# Patient Record
Sex: FEMALE | Race: WHITE | NOT HISPANIC OR LATINO | URBAN - METROPOLITAN AREA
[De-identification: names, ages, dates, MRNs, and addresses within clinical notes are randomized per-mention and may not be internally consistent; named-entity substitution may affect disease eponyms.]

---

## 2017-01-16 ENCOUNTER — COMMUNICATION - HEALTHEAST (OUTPATIENT)
Dept: INTERNAL MEDICINE | Facility: CLINIC | Age: 82
End: 2017-01-16

## 2017-01-16 DIAGNOSIS — G89.4 CHRONIC PAIN SYNDROME: ICD-10-CM

## 2017-04-17 ENCOUNTER — COMMUNICATION - HEALTHEAST (OUTPATIENT)
Dept: INTERNAL MEDICINE | Facility: CLINIC | Age: 82
End: 2017-04-17

## 2017-04-17 DIAGNOSIS — M81.0 OSTEOPOROSIS: ICD-10-CM

## 2017-07-05 ENCOUNTER — COMMUNICATION - HEALTHEAST (OUTPATIENT)
Dept: INTERNAL MEDICINE | Facility: CLINIC | Age: 82
End: 2017-07-05

## 2017-07-05 DIAGNOSIS — G89.4 CHRONIC PAIN SYNDROME: ICD-10-CM

## 2017-07-17 ENCOUNTER — COMMUNICATION - HEALTHEAST (OUTPATIENT)
Dept: INTERNAL MEDICINE | Facility: CLINIC | Age: 82
End: 2017-07-17

## 2017-07-17 DIAGNOSIS — M81.0 OSTEOPOROSIS: ICD-10-CM

## 2017-07-18 ENCOUNTER — COMMUNICATION - HEALTHEAST (OUTPATIENT)
Dept: INTERNAL MEDICINE | Facility: CLINIC | Age: 82
End: 2017-07-18

## 2017-07-18 DIAGNOSIS — M81.0 OSTEOPOROSIS: ICD-10-CM

## 2017-10-19 ENCOUNTER — COMMUNICATION - HEALTHEAST (OUTPATIENT)
Dept: INTERNAL MEDICINE | Facility: CLINIC | Age: 82
End: 2017-10-19

## 2017-10-19 DIAGNOSIS — M81.0 OSTEOPOROSIS: ICD-10-CM

## 2017-10-23 ENCOUNTER — OFFICE VISIT - HEALTHEAST (OUTPATIENT)
Dept: INTERNAL MEDICINE | Facility: CLINIC | Age: 82
End: 2017-10-23

## 2017-10-23 DIAGNOSIS — Z00.00 ROUTINE HEALTH MAINTENANCE: ICD-10-CM

## 2017-10-23 DIAGNOSIS — E78.5 HYPERLIPEMIA: ICD-10-CM

## 2017-10-23 DIAGNOSIS — R41.3 MEMORY PROBLEM: ICD-10-CM

## 2017-10-23 DIAGNOSIS — R41.81 SENILITY: ICD-10-CM

## 2017-10-23 DIAGNOSIS — G89.4 CHRONIC PAIN SYNDROME: ICD-10-CM

## 2017-10-23 ASSESSMENT — MIFFLIN-ST. JEOR: SCORE: 993.7

## 2017-10-24 ENCOUNTER — COMMUNICATION - HEALTHEAST (OUTPATIENT)
Dept: INTERNAL MEDICINE | Facility: CLINIC | Age: 82
End: 2017-10-24

## 2018-02-19 ENCOUNTER — COMMUNICATION - HEALTHEAST (OUTPATIENT)
Dept: INTERNAL MEDICINE | Facility: CLINIC | Age: 83
End: 2018-02-19

## 2018-02-19 DIAGNOSIS — G89.4 CHRONIC PAIN SYNDROME: ICD-10-CM

## 2018-02-19 DIAGNOSIS — M81.0 OSTEOPOROSIS: ICD-10-CM

## 2018-06-02 ENCOUNTER — COMMUNICATION - HEALTHEAST (OUTPATIENT)
Dept: INTERNAL MEDICINE | Facility: CLINIC | Age: 83
End: 2018-06-02

## 2018-06-02 DIAGNOSIS — M81.0 OSTEOPOROSIS: ICD-10-CM

## 2018-07-21 ENCOUNTER — COMMUNICATION - HEALTHEAST (OUTPATIENT)
Dept: INTERNAL MEDICINE | Facility: CLINIC | Age: 83
End: 2018-07-21

## 2018-07-21 DIAGNOSIS — G89.4 CHRONIC PAIN SYNDROME: ICD-10-CM

## 2018-08-30 ENCOUNTER — COMMUNICATION - HEALTHEAST (OUTPATIENT)
Dept: INTERNAL MEDICINE | Facility: CLINIC | Age: 83
End: 2018-08-30

## 2018-08-30 DIAGNOSIS — M81.0 OSTEOPOROSIS: ICD-10-CM

## 2018-11-26 ENCOUNTER — COMMUNICATION - HEALTHEAST (OUTPATIENT)
Dept: INTERNAL MEDICINE | Facility: CLINIC | Age: 83
End: 2018-11-26

## 2018-11-26 DIAGNOSIS — M81.0 OSTEOPOROSIS: ICD-10-CM

## 2019-02-13 ENCOUNTER — COMMUNICATION - HEALTHEAST (OUTPATIENT)
Dept: INTERNAL MEDICINE | Facility: CLINIC | Age: 84
End: 2019-02-13

## 2019-02-13 DIAGNOSIS — G89.4 CHRONIC PAIN SYNDROME: ICD-10-CM

## 2019-03-11 ENCOUNTER — COMMUNICATION - HEALTHEAST (OUTPATIENT)
Dept: INTERNAL MEDICINE | Facility: CLINIC | Age: 84
End: 2019-03-11

## 2019-03-11 DIAGNOSIS — M81.0 OSTEOPOROSIS: ICD-10-CM

## 2019-06-01 ENCOUNTER — COMMUNICATION - HEALTHEAST (OUTPATIENT)
Dept: INTERNAL MEDICINE | Facility: CLINIC | Age: 84
End: 2019-06-01

## 2019-06-01 DIAGNOSIS — M81.0 OSTEOPOROSIS: ICD-10-CM

## 2019-07-02 ENCOUNTER — COMMUNICATION - HEALTHEAST (OUTPATIENT)
Dept: INTERNAL MEDICINE | Facility: CLINIC | Age: 84
End: 2019-07-02

## 2019-07-02 DIAGNOSIS — M81.0 OSTEOPOROSIS: ICD-10-CM

## 2019-08-20 ENCOUNTER — COMMUNICATION - HEALTHEAST (OUTPATIENT)
Dept: FAMILY MEDICINE | Facility: CLINIC | Age: 84
End: 2019-08-20

## 2019-08-20 ENCOUNTER — OFFICE VISIT - HEALTHEAST (OUTPATIENT)
Dept: FAMILY MEDICINE | Facility: CLINIC | Age: 84
End: 2019-08-20

## 2019-08-20 ENCOUNTER — AMBULATORY - HEALTHEAST (OUTPATIENT)
Dept: SCHEDULING | Facility: CLINIC | Age: 84
End: 2019-08-20

## 2019-08-20 DIAGNOSIS — Z23 NEED FOR VACCINATION: ICD-10-CM

## 2019-08-20 DIAGNOSIS — M81.0 OSTEOPOROSIS: ICD-10-CM

## 2019-08-20 DIAGNOSIS — L97.111: ICD-10-CM

## 2019-08-20 DIAGNOSIS — Z00.00 ROUTINE GENERAL MEDICAL EXAMINATION AT A HEALTH CARE FACILITY: ICD-10-CM

## 2019-08-20 DIAGNOSIS — G89.4 CHRONIC PAIN SYNDROME: ICD-10-CM

## 2019-08-20 LAB
ANION GAP SERPL CALCULATED.3IONS-SCNC: 6 MMOL/L (ref 5–18)
CHLORIDE BLD-SCNC: 108 MMOL/L (ref 98–107)
CO2 SERPL-SCNC: 27 MMOL/L (ref 22–31)
CREAT SERPL-MCNC: 0.64 MG/DL (ref 0.6–1.1)
GFR SERPL CREATININE-BSD FRML MDRD: >60 ML/MIN/1.73M2
POTASSIUM BLD-SCNC: 4.2 MMOL/L (ref 3.5–5)
SODIUM SERPL-SCNC: 141 MMOL/L (ref 136–145)

## 2019-08-20 ASSESSMENT — MIFFLIN-ST. JEOR: SCORE: 908.88

## 2019-08-21 ENCOUNTER — COMMUNICATION - HEALTHEAST (OUTPATIENT)
Dept: NURSING | Facility: CLINIC | Age: 84
End: 2019-08-21

## 2019-08-27 ENCOUNTER — COMMUNICATION - HEALTHEAST (OUTPATIENT)
Dept: FAMILY MEDICINE | Facility: CLINIC | Age: 84
End: 2019-08-27

## 2019-08-27 ENCOUNTER — COMMUNICATION - HEALTHEAST (OUTPATIENT)
Dept: NURSING | Facility: CLINIC | Age: 84
End: 2019-08-27

## 2020-02-20 ENCOUNTER — COMMUNICATION - HEALTHEAST (OUTPATIENT)
Dept: FAMILY MEDICINE | Facility: CLINIC | Age: 85
End: 2020-02-20

## 2020-02-20 DIAGNOSIS — M81.0 OSTEOPOROSIS: ICD-10-CM

## 2020-06-03 ENCOUNTER — COMMUNICATION - HEALTHEAST (OUTPATIENT)
Dept: FAMILY MEDICINE | Facility: CLINIC | Age: 85
End: 2020-06-03

## 2020-06-03 DIAGNOSIS — G89.4 CHRONIC PAIN SYNDROME: ICD-10-CM

## 2020-08-26 ENCOUNTER — COMMUNICATION - HEALTHEAST (OUTPATIENT)
Dept: FAMILY MEDICINE | Facility: CLINIC | Age: 85
End: 2020-08-26

## 2020-08-26 DIAGNOSIS — M81.0 OSTEOPOROSIS: ICD-10-CM

## 2020-09-21 ENCOUNTER — OFFICE VISIT - HEALTHEAST (OUTPATIENT)
Dept: FAMILY MEDICINE | Facility: CLINIC | Age: 85
End: 2020-09-21

## 2020-09-21 DIAGNOSIS — L30.4 INTERTRIGO: ICD-10-CM

## 2020-09-21 DIAGNOSIS — H61.22 IMPACTED CERUMEN OF LEFT EAR: ICD-10-CM

## 2020-09-21 DIAGNOSIS — Z23 NEED FOR VACCINATION: ICD-10-CM

## 2020-09-21 DIAGNOSIS — M81.0 AGE-RELATED OSTEOPOROSIS WITHOUT CURRENT PATHOLOGICAL FRACTURE: ICD-10-CM

## 2020-09-21 DIAGNOSIS — R05.9 COUGH: ICD-10-CM

## 2020-09-21 DIAGNOSIS — K59.00 CONSTIPATION, UNSPECIFIED CONSTIPATION TYPE: ICD-10-CM

## 2020-09-21 DIAGNOSIS — Z00.00 MEDICARE ANNUAL WELLNESS VISIT, SUBSEQUENT: ICD-10-CM

## 2020-09-21 ASSESSMENT — MIFFLIN-ST. JEOR: SCORE: 875.77

## 2020-12-04 ENCOUNTER — COMMUNICATION - HEALTHEAST (OUTPATIENT)
Dept: FAMILY MEDICINE | Facility: CLINIC | Age: 85
End: 2020-12-04

## 2020-12-09 ENCOUNTER — COMMUNICATION - HEALTHEAST (OUTPATIENT)
Dept: FAMILY MEDICINE | Facility: CLINIC | Age: 85
End: 2020-12-09

## 2020-12-15 ENCOUNTER — COMMUNICATION - HEALTHEAST (OUTPATIENT)
Dept: FAMILY MEDICINE | Facility: CLINIC | Age: 85
End: 2020-12-15

## 2020-12-15 DIAGNOSIS — M81.0 OSTEOPOROSIS: ICD-10-CM

## 2021-01-07 ENCOUNTER — OFFICE VISIT - HEALTHEAST (OUTPATIENT)
Dept: INTERNAL MEDICINE | Facility: CLINIC | Age: 86
End: 2021-01-07

## 2021-01-07 DIAGNOSIS — M19.90 ARTHRITIS PAIN: ICD-10-CM

## 2021-01-07 DIAGNOSIS — M81.0 AGE-RELATED OSTEOPOROSIS WITHOUT CURRENT PATHOLOGICAL FRACTURE: ICD-10-CM

## 2021-01-07 DIAGNOSIS — R41.3 MEMORY PROBLEM: ICD-10-CM

## 2021-01-07 LAB
ANION GAP SERPL CALCULATED.3IONS-SCNC: 9 MMOL/L (ref 5–18)
BUN SERPL-MCNC: 18 MG/DL (ref 8–28)
CALCIUM SERPL-MCNC: 8.5 MG/DL (ref 8.5–10.5)
CHLORIDE BLD-SCNC: 107 MMOL/L (ref 98–107)
CO2 SERPL-SCNC: 23 MMOL/L (ref 22–31)
CREAT SERPL-MCNC: 0.62 MG/DL (ref 0.6–1.1)
ERYTHROCYTE [DISTWIDTH] IN BLOOD BY AUTOMATED COUNT: 13.1 % (ref 11–14.5)
GFR SERPL CREATININE-BSD FRML MDRD: >60 ML/MIN/1.73M2
GLUCOSE BLD-MCNC: 78 MG/DL (ref 70–125)
HCT VFR BLD AUTO: 43.8 % (ref 35–47)
HGB BLD-MCNC: 14.1 G/DL (ref 12–16)
MCH RBC QN AUTO: 32.4 PG (ref 27–34)
MCHC RBC AUTO-ENTMCNC: 32.2 G/DL (ref 32–36)
MCV RBC AUTO: 101 FL (ref 80–100)
PLATELET # BLD AUTO: 161 THOU/UL (ref 140–440)
PMV BLD AUTO: 11.5 FL (ref 8.5–12.5)
POTASSIUM BLD-SCNC: NORMAL MMOL/L
RBC # BLD AUTO: 4.35 MILL/UL (ref 3.8–5.4)
SODIUM SERPL-SCNC: 139 MMOL/L (ref 136–145)
WBC: 4.1 THOU/UL (ref 4–11)

## 2021-01-08 ENCOUNTER — COMMUNICATION - HEALTHEAST (OUTPATIENT)
Dept: INTERNAL MEDICINE | Facility: CLINIC | Age: 86
End: 2021-01-08

## 2021-01-08 DIAGNOSIS — M81.0 SENILE OSTEOPOROSIS: ICD-10-CM

## 2021-01-08 DIAGNOSIS — M81.0 AGE-RELATED OSTEOPOROSIS WITHOUT CURRENT PATHOLOGICAL FRACTURE: ICD-10-CM

## 2021-01-08 LAB — 25(OH)D3 SERPL-MCNC: 9 NG/ML (ref 30–80)

## 2021-01-14 ENCOUNTER — COMMUNICATION - HEALTHEAST (OUTPATIENT)
Dept: FAMILY MEDICINE | Facility: CLINIC | Age: 86
End: 2021-01-14

## 2021-01-18 ENCOUNTER — COMMUNICATION - HEALTHEAST (OUTPATIENT)
Dept: INTERNAL MEDICINE | Facility: CLINIC | Age: 86
End: 2021-01-18

## 2021-01-18 DIAGNOSIS — M81.0 AGE-RELATED OSTEOPOROSIS WITHOUT CURRENT PATHOLOGICAL FRACTURE: ICD-10-CM

## 2021-01-18 DIAGNOSIS — Z92.29 PERSONAL HISTORY OF OTHER DRUG THERAPY: ICD-10-CM

## 2021-01-19 ENCOUNTER — COMMUNICATION - HEALTHEAST (OUTPATIENT)
Dept: INTERNAL MEDICINE | Facility: CLINIC | Age: 86
End: 2021-01-19

## 2021-01-21 ENCOUNTER — AMBULATORY - HEALTHEAST (OUTPATIENT)
Dept: NURSING | Facility: CLINIC | Age: 86
End: 2021-01-21

## 2021-02-11 ENCOUNTER — DOCUMENTATION ONLY (OUTPATIENT)
Dept: OTHER | Facility: CLINIC | Age: 86
End: 2021-02-11

## 2021-02-11 ENCOUNTER — AMBULATORY - HEALTHEAST (OUTPATIENT)
Dept: OTHER | Facility: CLINIC | Age: 86
End: 2021-02-11

## 2021-05-24 ENCOUNTER — RECORDS - HEALTHEAST (OUTPATIENT)
Dept: ADMINISTRATIVE | Facility: CLINIC | Age: 86
End: 2021-05-24

## 2021-05-25 ENCOUNTER — RECORDS - HEALTHEAST (OUTPATIENT)
Dept: ADMINISTRATIVE | Facility: CLINIC | Age: 86
End: 2021-05-25

## 2021-05-26 ENCOUNTER — RECORDS - HEALTHEAST (OUTPATIENT)
Dept: ADMINISTRATIVE | Facility: CLINIC | Age: 86
End: 2021-05-26

## 2021-05-27 ENCOUNTER — RECORDS - HEALTHEAST (OUTPATIENT)
Dept: ADMINISTRATIVE | Facility: CLINIC | Age: 86
End: 2021-05-27

## 2021-05-28 ENCOUNTER — RECORDS - HEALTHEAST (OUTPATIENT)
Dept: ADMINISTRATIVE | Facility: CLINIC | Age: 86
End: 2021-05-28

## 2021-05-29 NOTE — TELEPHONE ENCOUNTER
RN cannot approve Refill Request    RN can NOT refill this medication med is not covered by policy/route to provider. Last office visit: Visit date not found Last Physical: Visit date not found Last MTM visit: Visit date not found Last visit same specialty: 10/1/2015 Chilo العلي MD.  Next visit within 3 mo: Visit date not found  Next physical within 3 mo: Visit date not found      Zoya Yanez, [facility] Connection Triage/Med Refill 6/1/2019    Requested Prescriptions   Pending Prescriptions Disp Refills     raloxifene (EVISTA) 60 mg tablet [Pharmacy Med Name: RALOXIFENE TAB 60MG] 90 tablet 0     Sig: TAKE 1 TABLET DAILY       There is no refill protocol information for this order

## 2021-05-30 NOTE — TELEPHONE ENCOUNTER
Pts son answered the phone and there is no consent on file   Pt has alzheimer and pts son knows that the pt needs an appointment   Pts son will call us back     Rx pended for 30 day supply

## 2021-05-30 NOTE — TELEPHONE ENCOUNTER
RN cannot approve Refill Request    Former patient of Korina & has not established care with another provider.  Please assign refill request to covering provider per Clinic standard process.    RN can NOT refill this medication med is not covered by policy/route to provider. Last office visit: Visit date not found Last Physical: Visit date not found Last MTM visit: Visit date not found Last visit same specialty: 10/1/2015 Chilo العلي MD.  Next visit within 3 mo: Visit date not found  Next physical within 3 mo: Visit date not found      Elsi Higgins, [facility] Triage/Med Refill 7/2/2019    Requested Prescriptions   Pending Prescriptions Disp Refills     raloxifene (EVISTA) 60 mg tablet [Pharmacy Med Name: RALOXIFENE TAB 60MG] 30 tablet 0     Sig: TAKE 1 TABLET DAILY (NEED  TO ESTABLISH CARE)       There is no refill protocol information for this order

## 2021-05-31 ENCOUNTER — RECORDS - HEALTHEAST (OUTPATIENT)
Dept: ADMINISTRATIVE | Facility: CLINIC | Age: 86
End: 2021-05-31

## 2021-05-31 ENCOUNTER — COMMUNICATION - HEALTHEAST (OUTPATIENT)
Dept: SCHEDULING | Facility: CLINIC | Age: 86
End: 2021-05-31

## 2021-05-31 VITALS — WEIGHT: 141 LBS | HEIGHT: 63 IN | BODY MASS INDEX: 24.98 KG/M2

## 2021-05-31 NOTE — PROGRESS NOTES
Scheduled Follow Up Call: Attempt 2 Community Health Worker called and left a message for the patient. If the patient is returning my call, please transfer the patient to Sharon at ext. [phone].    LMTCB: [facility] Referral- No returned calls  Unreachable letter sent 8/27/19

## 2021-05-31 NOTE — PROGRESS NOTES
Scheduled Follow Up Call: Attempt 1 Community Health Worker called and left a message for the patient. If the patient is returning my call, please transfer the patient to Sharon at ext. [phone].    LMTCB: [facility] Referral  Patient and son Drew have [facility] contact number.  Patient new to clinic, may need support team    Next Outreach: 8/23/19

## 2021-05-31 NOTE — PROGRESS NOTES
Assessment and Plan:     1. Routine general medical examination at a health care facility  - Electrolyte Profile  - Creatinine  Referral to care management was also ordered just so patient can be established and have a care team, given her age, and son unsure of how her care will look like in the future, though at the current time he is managing well.    2. Chronic pain syndrome  Patient has had a history of chronic pain syndrome related to her osteoarthritis, and had been taking Celebrex daily, though her son states that patient has not complained about pain.  For this reason, advised him to try holding Celebrex to see if she is still in pain, and to not use medication if not needed.  - celecoxib (CELEBREX) 200 MG capsule; Take 1 capsule (200 mg total) by mouth daily as needed for pain.  Dispense: 90 capsule; Refill: 1    3. Osteoporosis  I would like to check DEXA scan, consider stopping meloxicam given she has been on medication for several years and has not had a recent DEXA scan.  - raloxifene (EVISTA) 60 mg tablet; Take 1 tablet (60 mg total) by mouth daily.  Dispense: 90 tablet; Refill: 1  - DXA Bone Density Scan; Future    4. Skin ulcer of hip, right, limited to breakdown of skin (H)  This may be due to possible pressure ulcer, given she does sit or lie for long periods of time.  Recommend referral to wound clinic for further evaluation.  - Ambulatory referral to Wound Clinic    5. Need for vaccination  I have discussed the risks and benefits of all of the vaccine components with the patient.  All questions have been answered.  - Td, Preservative Free (green label)     The patient's current medical problems were reviewed.    I have had an Advance Directives discussion with the patient.  The following health maintenance schedule was reviewed with the patient and provided in printed form in the after visit summary:   Health Maintenance   Topic Date Due     DXA SCAN  04/23/1989     ZOSTER VACCINES (2 of 3)  "06/30/2009     FALL RISK ASSESSMENT  03/19/2016     TD 18+ HE  04/18/2016     MEDICARE ANNUAL WELLNESS VISIT  10/23/2018     INFLUENZA VACCINE RULE BASED (1) 08/01/2019     ADVANCE CARE PLANNING  10/23/2022     PNEUMOCOCCAL POLYSACCHARIDE VACCINE AGE 65 AND OVER  Completed     PNEUMOCOCCAL CONJUGATE VACCINE FOR ADULTS (PCV13 OR PREVNAR)  Completed        Subjective:   Chief Complaint: Lorna England is an 89 y.o. female, new to family medicine, here today with son \"Bill\", here for an Annual Wellness visit.  Their previous PCP has retired, though she hasn't been seen since 2017.  Bill says that patient's cognition has declined since last visit, though she is still easy to care for in his home though is thinking about care in the future and possible ongoing decline.      HPI:  Requesting refill of of raloxifene, has been on medication \"for years\" for osteoporosis. Hasn't had DEXA scan in our system.    Son had also noted over the last month some skin breakdown on her right hip.  Patient has had history of for total hip replacements, 2 on each side.  The skin changes he has noted over the last month.      Review of Systems:  Understands and can comprehend, but has difficulty with word finding.  Please see above.  The rest of the review of systems are negative for all systems.    Patient Care Team:  Chilo العلي MD as PCP - General  Rm Lane MD (Orthopedic Surgery)     Patient Active Problem List   Diagnosis     Esophageal Disorders     Chronic Pain Syndrome     Osteoarthritis     Osteoporosis     Do Not Resuscitate Form In Chart     Irritable Bowel Syndrome     Osteoarthritis Of The Hip     Frailty     Carpal Tunnel Syndrome     Diverticulosis     Nephrolithiasis     Urinary Frequency     Senile osteoporosis     Memory problem     No past medical history on file.   Past Surgical History:   Procedure Laterality Date     MS APPENDECTOMY      Description: Appendectomy;  Recorded: 11/30/2007;     MS " REMOVE TONSILS/ADENOIDS,<13 Y/O      Description: Tonsillectomy With Adenoidectomy;  Recorded: 11/30/2007;     WI TOTAL HIP ARTHROPLASTY      Description: Total Hip Replacement;  Recorded: 11/30/2007;     WI TOTAL HIP ARTHROPLASTY      Description: Total Hip Replacement;  Recorded: 11/30/2007;  Comments: total 5 hip revisions      No family history on file.   Social History     Socioeconomic History     Marital status:      Spouse name: Not on file     Number of children: Not on file     Years of education: Not on file     Highest education level: Not on file   Occupational History     Not on file   Social Needs     Financial resource strain: Not on file     Food insecurity:     Worry: Not on file     Inability: Not on file     Transportation needs:     Medical: Not on file     Non-medical: Not on file   Tobacco Use     Smoking status: Never Smoker     Smokeless tobacco: Never Used   Substance and Sexual Activity     Alcohol use: Not on file     Drug use: Not on file     Sexual activity: Not on file   Lifestyle     Physical activity:     Days per week: Not on file     Minutes per session: Not on file     Stress: Not on file   Relationships     Social connections:     Talks on phone: Not on file     Gets together: Not on file     Attends Zoroastrian service: Not on file     Active member of club or organization: Not on file     Attends meetings of clubs or organizations: Not on file     Relationship status: Not on file     Intimate partner violence:     Fear of current or ex partner: Not on file     Emotionally abused: Not on file     Physically abused: Not on file     Forced sexual activity: Not on file   Other Topics Concern     Not on file   Social History Narrative     Not on file      Current Outpatient Medications   Medication Sig Dispense Refill     celecoxib (CELEBREX) 200 MG capsule TAKE 1 CAPSULE DAILY 90 capsule 1     raloxifene (EVISTA) 60 mg tablet TAKE 1 TABLET DAILY 30 tablet 0     amoxicillin  "(AMOXIL) 500 MG capsule Take 500 mg by mouth as needed. Take 4 capsules 1 hour before dental appointments       celecoxib (CELEBREX) 200 MG capsule TAKE 1 CAPSULE DAILY 90 capsule 1     multivitamin therapeutic (THERAGRAN) tablet Take 1 tablet by mouth daily.       raloxifene (EVISTA) 60 mg tablet TAKE 1 TABLET DAILY (NEED  TO ESTABLISH CARE) 30 tablet 0     triamcinolone (KENALOG) 0.1 % cream Apply to rash with Nystatin cream twice daily as needed 30 g 1     No current facility-administered medications for this visit.       Objective:   Vital Signs:   Visit Vitals  /72 (Patient Site: Right Arm, Patient Position: Sitting, Cuff Size: Adult Regular)   Pulse 76   Temp 97.6  F (36.4  C) (Axillary)   Resp 18   Ht 5' 3\" (1.6 m)   Wt 122 lb 4.8 oz (55.5 kg)   BMI 21.66 kg/m         VisionScreening:  No exam data present     PHYSICAL EXAM  General Appearance: Alert, cooperative, no distress, appears stated age  Head: Normocephalic, without obvious abnormality, atraumatic  Eyes: PERRL, conjunctiva/corneas clear, EOM's intact  Ears: Normal TM's and external ear canals, both ears  Throat: Lips, mucosa, and tongue normal; teeth and gums normal  Neck: Supple, symmetrical, trachea midline, no adenopathy;  thyroid: not enlarged, symmetric, no tenderness/mass/nodules;   Back: Symmetric, no curvature, ROM normal, no CVA tenderness  Lungs: Clear to auscultation bilaterally, respirations unlabored  Breasts: No breast masses, tenderness, asymmetry, or nipple discharge.  Heart: Regular rate and rhythm, no murmur.   Abdomen: Soft, non-tender, bowel sounds active all four quadrants,  no masses, no organomegaly  Pelvic:Not examined  Extremities: Extremities normal, atraumatic, no cyanosis or edema  Skin: Skin color, texture, turgor normal, no rashes or lesions EXCEPT 2 patches of thin skin breakdown noted on right lateral hip  Lymph nodes: Cervical, supraclavicular, and axillary nodes normal  Neurologic: Alert.   Psych: Normal " affect.  Does not appear anxious or depressed.        Assessment Results 8/20/2019   Activities of Daily Living 5-6 - Severe functional impairment   Instrumental Activities of Daily Living 5-6 - Severe functional impairment   Get Up and Go Score -   Mini Cog Total Score 0   Some recent data might be hidden     A Mini-Cog score of 0-2 suggests the possibility of dementia, score of 3-5 suggests no dementia    Identified Health Risks:     The patient reports that she has difficulty with activities of daily living, but son feels he can care for her at this time.The patient reports that she has difficulty with instrumental activities of daily living.  She was provided with a list of local organizations that provide support services and will refer to home health as well.   Information on urinary incontinence and treatment options given to patient.  The patient was provided with suggestions to help her develop a healthy emotional lifestyle.   Patient's advanced directive was discussed and I am comfortable with the patient's wishes.        The patient was provided with appropriate referrals to address her memory problem.

## 2021-05-31 NOTE — PATIENT INSTRUCTIONS - HE
Patient Education   Activities of Daily Living  Your Health Risk Assessment indicates you have difficulties with activities of daily living such as eating, getting dressed, grooming, bathing, walking, or using the toilet. Please make a follow up appointment for us to address this issue in more detail.     Patient Education   Instrumental Activities of Daily Living  Your Health Risk Assessment indicates you have difficulties with instrumental activities of daily living which include laundry, housekeeping, banking, shopping, using the telephone, food preparation, transportation, or taking your own medications. Please make a follow up appointment for us to address this issue in more detail.    [employer] has resources available on the following website: [email or web address]     Also, here is a local agency that provides help with meals and other assistance:   [facility] Line: 849.659.2639     Patient Education   Urinary Incontinence, Female (Adult)  Urinary incontinence means loss of control of the bladder. This problem affects many women, especially as they get older. If you have incontinence, you may be embarrassed to ask for help. But know that this problem can be treated.  Types of Incontinence  There are different types of incontinence. Two of the main types are described here. You can have more than one type.    Stress incontinence. With this type, urine leaks when pressure (stress) is put on the bladder. This may happen when you cough, sneeze, or laugh. Stress incontinence most often occurs because the pelvic floor muscles that support the bladder and urethra are weak. This can happen after pregnancy and vaginal childbirth or a hysterectomy. It can also be due to excess body weight or hormone changes.    Urge incontinence (also called overactive bladder). With this type, a sudden urge to urinate is felt often. This may happen even though there may not be much urine in the bladder. The  need to urinate often during the night is common. Urge incontinence most often occurs because of bladder spasms. This may be due to bladder irritation or infection. Damage to bladder nerves or pelvic muscles, constipation, and certain medicines can also lead to urge incontinence.  Treatment of urinary incontinence depends on the cause. Further evaluation is needed to find the type you have. This will likely include an exam and certain tests. Based on the results, you and your healthcare provider can then plan treatment. Until a diagnosis is made, the home care tips below can help relieve symptoms.  Home care    Do pelvic floor muscle exercises, if they are prescribed. The pelvic floor muscles help support the bladder and urethra. Many women find that their symptoms improve when doing special exercises that strengthen these muscles. To do the exercises contract the muscles you would use to stop your stream of urine, but do this when you re not urinating. Hold for 10 seconds, then relax. Repeat 10 to 20 times in a row, at least 3 times a day. Your provider may give you other instructions for how to do the exercises and how often.    Keep a bladder diary. This helps track how often and how much you urinate over a set period of time. Bring this diary with you to your next visit with the provider. The information can help your provider learn more about your bladder problem.    Lose weight, if advised to by your provider. Excess weight puts pressure on the bladder. Your provider can help you create a weight-loss plan that s right for you. This may include exercising more and making certain diet changes.    Don't consume foods and drinks that may irritate the bladder. These can include alcohol and caffeinated drinks.    Quit smoking. Smoking and other tobacco use can lead to chronic cough that strains the pelvic floor muscles. Smoking may also damage the bladder and urethra. Talk with your provider about treatments or  methods you can use to quit smoking.    If drinking large amounts of fluid causes you to have symptoms, you may be advised to limit your fluid intake. You may also be advised to drink most of your fluids during the day and to limit fluids at night.    If you re worried about urine leakage or accidents, you may wear absorbent pads to catch urine. Change the pads often. This helps reduce discomfort. It may also reduce the risk of skin or bladder infections.  Follow-up care  Follow up with your healthcare provider, or as directed. It may take some to find the right treatment for your problem. Your treatment plan may include special therapies or medicines. Certain procedures or surgery may also be options. Be sure to discuss any questions you have with your provider.  When to seek medical advice  Call the healthcare provider right away if any of these occur:    Fever of 100.4 F (38 C) or higher, or as directed by your provider    Bladder pain or fullness    Abdominal swelling    Nausea or vomiting    Back pain    Weakness, dizziness or fainting  Date Last Reviewed: 10/1/2017    4594-9579 [employer]. [address] [place] [address]. All rights reserved. This information is not intended as a substitute for professional medical care. Always follow your healthcare professional's instructions.     Patient Education   Your Health Risk Assessment indicates you feel you are not in good emotional health.    Recreation   Recreation is not limited to sports and team events. It includes any activity that provides relaxation, interest, enjoyment, and exercise. Recreation provides an outlet for physical, mental, and social energy. It can give a sense of worth and achievement. It can help you stay healthy.    Mental Exercise and Social Involvement  Mental and emotional health is as important as physical health. Keep in touch with friends and family. Stay as active as possible. Continue to learn and challenge  yourself.   Things you can do to stay mentally active are:    Learn something new, like a foreign language or musical instrument.     Play SCRABBLE or do crossword puzzles. If you cannot find people to play these games with you at home, you can play them with others on your computer through the Internet.     Join a games club--anything from card games to chess or checkers or lawn bowling.     Start a new hobby.     Go back to school.     Volunteer.     Read.     Keep up with world events.         Advance Directive  Patient s advance directive was discussed and I am comfortable with the patient s wishes.  Patient Education   Personalized Prevention Plan  You are due for the preventive services outlined below.  Your care team is available to assist you in scheduling these services.  If you have already completed any of these items, please share that information with your care team to update in your medical record.  Health Maintenance   Topic Date Due     DXA SCAN  04/23/1989     ZOSTER VACCINES (2 of 3) 06/30/2009     FALL RISK ASSESSMENT  03/19/2016     TD 18+ HE  04/18/2016     MEDICARE ANNUAL WELLNESS VISIT  10/23/2018     INFLUENZA VACCINE RULE BASED (1) 08/01/2019     ADVANCE CARE PLANNING  10/23/2022     PNEUMOCOCCAL POLYSACCHARIDE VACCINE AGE 65 AND OVER  Completed     PNEUMOCOCCAL CONJUGATE VACCINE FOR ADULTS (PCV13 OR PREVNAR)  Completed

## 2021-06-03 VITALS — WEIGHT: 122.3 LBS | BODY MASS INDEX: 21.67 KG/M2 | HEIGHT: 63 IN

## 2021-06-04 VITALS
TEMPERATURE: 98 F | HEART RATE: 79 BPM | WEIGHT: 115 LBS | BODY MASS INDEX: 20.38 KG/M2 | HEIGHT: 63 IN | RESPIRATION RATE: 20 BRPM | OXYGEN SATURATION: 97 % | SYSTOLIC BLOOD PRESSURE: 112 MMHG | DIASTOLIC BLOOD PRESSURE: 68 MMHG

## 2021-06-05 VITALS — TEMPERATURE: 97 F | HEART RATE: 102 BPM | WEIGHT: 113 LBS | BODY MASS INDEX: 20.02 KG/M2 | OXYGEN SATURATION: 95 %

## 2021-06-06 NOTE — TELEPHONE ENCOUNTER
RN cannot approve Refill Request    RN can NOT refill this medication med is not covered by policy/route to provider. Last office visit: Visit date not found Last Physical: 8/20/2019 Last MTM visit: Visit date not found Last visit same specialty: Visit date not found.  Next visit within 3 mo: Visit date not found  Next physical within 3 mo: Visit date not found      Svetlana Glover, [facility] Triage/Med Refill 2/23/2020    Requested Prescriptions   Pending Prescriptions Disp Refills     raloxifene (EVISTA) 60 mg tablet [Pharmacy Med Name: RALOXIFENE TAB 60MG] 90 tablet 1     Sig: TAKE 1 TABLET DAILY       There is no refill protocol information for this order

## 2021-06-08 NOTE — TELEPHONE ENCOUNTER
RN cannot approve Refill Request    RN can NOT refill this medication med is not covered by policy/route to provider     . Last office visit: Visit date not found Last Physical: 8/20/2019 Last MTM visit: Visit date not found Last visit same specialty: Visit date not found.  Next visit within 3 mo: Visit date not found  Next physical within 3 mo: Visit date not found      Angelika Castillo, [facility] Triage/Med Refill 6/4/2020    Requested Prescriptions   Pending Prescriptions Disp Refills     celecoxib (CELEBREX) 200 MG capsule [Pharmacy Med Name: CELECOXIB CAP 200MG] 90 capsule 1     Sig: TAKE 1 CAPSULE DAILY AS    NEEDED FOR PAIN       There is no refill protocol information for this order

## 2021-06-11 NOTE — TELEPHONE ENCOUNTER
3 month rx signed. Needs appointment before additional refills, as I haven't seen since August 2019.  Also, we had discussed that she should see our osteoporosis doctors instead of continuing this med too. Please assist patient in scheduling  appointment.

## 2021-06-11 NOTE — PROGRESS NOTES
Assessment and Plan:     Patient has been advised of split billing requirements and indicates understanding: Yes  1. Medicare annual wellness visit, subsequent  Frail, elderly woman, essentially wheelchair bound.  Son cares for her 24/7. Senile type dementia, patient did not speak with me today.    2. Constipation, unspecified constipation type  Appears that patient can have regular bowel movements with MiraLAX.  Given she is only taking it once every 3 days, discussed with son that he may increase to every other day or even daily if needed.    3. Intertrigo  Rash appears consistent with intertrigo, will prescribe nystatin powder, discussed use.  However if symptoms persist despite use, recommend returning to clinic for reevaluation.  - nystatin (MYCOSTATIN) powder; Apply to affected area 3 times daily.  Dispense: 60 g; Refill: 0    4. Cough  This has been chronic, she has not had any fevers.  I did not hear patient cough one time while in clinic today.  Recommend she get chest x-ray for now, I would like her to follow-up with a video visit in 2 weeks and we can discuss additional possible other etiologies and treatment plan if needed.    - XR Chest 2 Views    5. Age-related osteoporosis without current pathological fracture  Bone density scan in 2019 did show severe osteoporosis, we discussed possibly starting Prolia, however patient and son did not follow-up.  We will continue her loxapine for now, and recommend rechecking bone density scan.  - DXA Bone Density Scan; Future  - DXA Bone Density Scan    Cerumen:   I personally removed cerumen with curette today.    6. Need for vaccination  I have discussed the risks and benefits of all of the vaccine components with the patient.  All questions have been answered.  - Influenza,Quad,High Dose,PF 65 YR+     The patient's current medical problems were reviewed.    I have had an Advance Directives discussion with the patient.  The following health maintenance schedule  "was reviewed with the patient and provided in printed form in the after visit summary:   Health Maintenance   Topic Date Due     ZOSTER VACCINES (2 of 3) 06/30/2009     INFLUENZA VACCINE RULE BASED (1) 08/01/2020     MEDICARE ANNUAL WELLNESS VISIT  08/20/2020     FALL RISK ASSESSMENT  08/20/2020     DXA SCAN  08/23/2021     ADVANCE CARE PLANNING  08/20/2024     TD 18+ HE  08/20/2029     PNEUMOCOCCAL IMMUNIZATION 65+ LOW/MEDIUM RISK  Completed     HEPATITIS B VACCINES  Aged Out        Subjective:   Chief Complaint: Lorna England is an 89 y.o. female here, here today with her son \"Bill\" for an Annual Wellness visit. History from her son, as patient unable to speak.      HPI:  Cough is present all the time, though acts up more in evening.  No cough at night during sleep.  Started about 1 year ago, though more noticeable in last 6 months.  No fevers.  Has rhinorrhea \"on and off\".    Constipation: Is takingmiralax every 3rd day or so.  Had colonoscopy in her 60s was told her \"bowel muscle\" not as strong, though \"no concerns\" per her son.  Will have a bowel movement about every 3rd day.        Patient had some red skin under her left breast.  Son has mentioned that they had spoken to other providers about it in the past, was told it was due to skin not being dry.  Has gone away and come back periodically.        Review of Systems:   Please see above.  The rest of the review of systems are negative for all systems.    Patient Care Team:  Provider, No Primary Care as PCP - General  Rm Lane MD (Orthopedic Surgery)  Dee Hester MD as Assigned PCP     Patient Active Problem List   Diagnosis     Esophageal Disorders     Chronic Pain Syndrome     Osteoarthritis     Osteoporosis     Do Not Resuscitate Form In Chart     Irritable Bowel Syndrome     Osteoarthritis Of The Hip     Frailty     Carpal Tunnel Syndrome     Diverticulosis     Nephrolithiasis     Urinary Frequency     Senile osteoporosis     Memory problem "     Past Medical History:   Diagnosis Date     Carpal tunnel syndrome     Created by Conversion      Chronic pain syndrome     Created by Conversion      Diverticulosis     Created by Conversion  Replacement Utility updated for latest IMO load     Do Not Resuscitate Form In Chart     Created by [employer] Annotation: Feb 2 2012  3:45PM - Chilo العلي: discussed with pt  and son Drew.      Esophageal Disorders     Created by Conversion [employer] [facility] Annotation: Feb 2 2012  3:40PM - Chilo العلي:  GERD/presbyesophagus  Replacement Utility updated for latest IMO load     Frailty     Created by Conversion  Replacement Utility updated for latest IMO load     Irritable bowel syndrome     Created by Conversion      Memory problem 10/23/2017    Senile type dementia -began ~ age 89     Nephrolithiasis     Created by Conversion      Osteoarthritis     Created by Conversion [employer] Annotation: Nov 30 2007  8:51AM - Chilo العلي: multiple sites  Replacement Utility updated for latest IMO load     Osteoarthritis Of The Hip     Created by Conversion      Osteoporosis     Created by Conversion  Replacement Utility updated for latest IMO load     Senile osteoporosis 1/16/2015     Urinary frequency     Created by Conversion       Past Surgical History:   Procedure Laterality Date     KS APPENDECTOMY      Description: Appendectomy;  Recorded: 11/30/2007;     KS REMOVE TONSILS/ADENOIDS,<13 Y/O      Description: Tonsillectomy With Adenoidectomy;  Recorded: 11/30/2007;     KS TOTAL HIP ARTHROPLASTY      Description: Total Hip Replacement;  Recorded: 11/30/2007;     KS TOTAL HIP ARTHROPLASTY      Description: Total Hip Replacement;  Recorded: 11/30/2007;  Comments: total 5 hip revisions      No family history on file.   Social History     Socioeconomic History     Marital status:      Spouse name: Not on file     Number of children: Not on file     Years of education: Not on file     Highest education level: Not  on file   Occupational History     Not on file   Social Needs     Financial resource strain: Not on file     Food insecurity     Worry: Not on file     Inability: Not on file     Transportation needs     Medical: Not on file     Non-medical: Not on file   Tobacco Use     Smoking status: Never Smoker     Smokeless tobacco: Never Used   Substance and Sexual Activity     Alcohol use: Not on file     Drug use: Not on file     Sexual activity: Not on file   Lifestyle     Physical activity     Days per week: Not on file     Minutes per session: Not on file     Stress: Not on file   Relationships     Social connections     Talks on phone: Not on file     Gets together: Not on file     Attends [employer] service: Not on file     Active member of club or organization: Not on file     Attends meetings of clubs or organizations: Not on file     Relationship status: Not on file     Intimate partner violence     Fear of current or ex partner: Not on file     Emotionally abused: Not on file     Physically abused: Not on file     Forced sexual activity: Not on file   Other Topics Concern     Not on file   Social History Narrative     Not on file      Current Outpatient Medications   Medication Sig Dispense Refill     celecoxib (CELEBREX) 200 MG capsule TAKE 1 CAPSULE DAILY 90 capsule 1     raloxifene (EVISTA) 60 mg tablet TAKE 1 TABLET DAILY 30 tablet 0     amoxicillin (AMOXIL) 500 MG capsule Take 500 mg by mouth as needed. Take 4 capsules 1 hour before dental appointments       No current facility-administered medications for this visit.       Objective:   Vital Signs:   Visit Vitals  /68 (Patient Site: Right Arm, Patient Position: Sitting, Cuff Size: Child)   Pulse 79   Temp 98  F (36.7  C) (Tympanic)   Resp 20   SpO2 97%          VisionScreening:  No exam data present     PHYSICAL EXAM  General Appearance: Alert, cooperative, no distress, appears stated age  Head: Normocephalic, without obvious abnormality,  atraumatic  Eyes: PERRL, conjunctiva/corneas clear, EOM's intact  Ears: Normal TM's and external ear canals after I personally removed cerumen of left canal, both ears  Throat: Lips, mucosa, and tongue normal; teeth and gums normal  Neck: Supple, symmetrical, trachea midline, no adenopathy;  thyroid: not enlarged, symmetric, no tenderness/mass/nodules;   Back: Symmetric, no curvature, ROM normal, no CVA tenderness  Lungs: Clear to auscultation bilaterally, respirations unlabored  Breasts: No breast masses, tenderness, asymmetry, or nipple discharge.  Heart: Regular rate and rhythm, no murmur.   Abdomen: Soft, non-tender, bowel sounds active all four quadrants,  no masses, no organomegaly  Pelvic:Not examined  Extremities: Extremities normal, atraumatic, no cyanosis or edema  Skin: Skin color, texture, turgor normal, no rashes or lesions EXCEPT patch of erythema noted under left breast  Lymph nodes: Cervical, supraclavicular, and axillary nodes normal  Neurologic: Alert.   Psych: Normal affect.  Does not appear anxious or depressed.        Assessment Results 9/21/2020   Activities of Daily Living 5-6 - Severe functional impairment   Instrumental Activities of Daily Living 5-6 - Severe functional impairment   Mini Cog Total Score 0   Some recent data might be hidden     A Mini-Cog score of 0-2 suggests the possibility of dementia, score of 3-5 suggests no dementia        Identified Health Risks:     The patient was provided with suggestions to help her develop a healthy physical lifestyle.   The patient reports that she has difficulty with activities of daily living. I have asked that the patient make a follow up appointment in 4 weeks where this issue will be further evaluated and addressed.  The patient reports that she has difficulty with instrumental activities of daily living.  She was provided with a list of local organizations that provide support services and advised to make a follow up appointment in 4 weeks  to address this further.   Information on urinary incontinence and treatment options given to patient.  The patient was provided with suggestions to help her develop a healthy emotional lifestyle.   She is at risk for falling and has been provided with information to reduce the risk of falling at home.      The patient was provided with appropriate referrals to address her memory problem.

## 2021-06-11 NOTE — TELEPHONE ENCOUNTER
RN cannot approve Refill Request    RN can NOT refill this medication No established PCP. Last office visit: Visit date not found Last Physical: 8/20/2019 Last MTM visit: Visit date not found Last visit same specialty: Visit date not found.  Next visit within 3 mo: Visit date not found  Next physical within 3 mo: Visit date not found      Cheyenne Angulo, [facility] Triage/Med Refill 8/29/2020    Requested Prescriptions   Pending Prescriptions Disp Refills     raloxifene (EVISTA) 60 mg tablet [Pharmacy Med Name: RALOXIFENE TAB 60MG] 90 tablet 1     Sig: TAKE 1 TABLET DAILY       There is no refill protocol information for this order

## 2021-06-13 NOTE — TELEPHONE ENCOUNTER
Patient Returning Call  Reason for call:  Son, Bill calling back. No, consent on file to speak to son.  Information relayed to patient:  none  Patient has additional questions:  Yes  If YES, what are your questions/concerns:  Please advise.  Okay to leave a detailed message?: No

## 2021-06-13 NOTE — TELEPHONE ENCOUNTER
1st attempt to contact patient    Left message to call back for: Lorna     Information to relay to patient:    Left message that a letter with results from x/ray done recently will be mailed to patient home address. If further questions please schedule an appointment with Dr. Hester due to no consent to communicate being on file for this patient.

## 2021-06-13 NOTE — TELEPHONE ENCOUNTER
My 12/9 letter recommend patient be seen at osteoporosis clinic, have they scheduled appointment yet?

## 2021-06-13 NOTE — TELEPHONE ENCOUNTER
Called pt, left a msg to call back. When call is returned, please relay MD notes and document. Thank you.

## 2021-06-13 NOTE — TELEPHONE ENCOUNTER
LMTCB  LMTCB - please relay message from provider upon return call. Document and route back to Dr. Hester with response.  Thanks!

## 2021-06-13 NOTE — TELEPHONE ENCOUNTER
RN cannot approve Refill Request    RN can NOT refill this medication med is not covered by policy/route to provider. Last office visit: Visit date not found Last Physical: 9/21/2020 Last MTM visit: Visit date not found Last visit same specialty: Visit date not found.  Next visit within 3 mo: Visit date not found  Next physical within 3 mo: Visit date not found      Angelika Castillo, [facility] Triage/Med Refill 12/16/2020    Requested Prescriptions   Pending Prescriptions Disp Refills     raloxifene (EVISTA) 60 mg tablet [Pharmacy Med Name: RALOXIFENE TAB 60MG] 90 tablet 0     Sig: TAKE 1 TABLET DAILY       There is no refill protocol information for this order

## 2021-06-13 NOTE — PROGRESS NOTES
"  Assessment and Plan:   Frail elderly woman essentially wheelchair-bound.  He is here today for her \"annual wellness visit\".  Her son is here with her he provides care 24 7.  Overall condition improved markedly since she moved in with her son several years ago.  Mild confusion and senile type dementia but her functional status is quite stable she benefits from the Celebrex with her arthritis will check labs today  Code status DNR as before    1. Routine health maintenance      2. Chronic pain syndrome  Marked decrease in pain issues since on celebrex and moved in with son (Drew) several years ago  - celecoxib (CELEBREX) 200 MG capsule; TAKE 1 CAPSULE DAILY  Dispense: 90 capsule; Refill: 1  - Comprehensive Metabolic Panel    3. Hyperlipemia    - HM2(CBC w/o Differential)    4. Frailty  Son cares for her    5. Memory problem  Senile type slowly progressive. No behavior problems or fall problems etc      The patient's current medical problems were reviewed.    I have had an Advance Directives discussion with the patient.  The following health maintenance schedule was reviewed with the patient and provided in printed form in the after visit summary:   Health Maintenance   Topic Date Due     DXA SCAN  04/23/1989     FALL RISK ASSESSMENT  03/19/2016     TD 18+ HE  04/18/2016     ADVANCE DIRECTIVES DISCUSSED WITH PATIENT  04/10/2017     PNEUMOCOCCAL POLYSACCHARIDE VACCINE AGE 65 AND OVER  Completed     INFLUENZA VACCINE RULE BASED  Completed     PNEUMOCOCCAL CONJUGATE VACCINE FOR ADULTS (PCV13 OR PREVNAR)  Completed     ZOSTER VACCINE  Completed        Subjective:   Chief Complaint: Lorna England is an 89 y.o. female here for an Annual Wellness visit.   HPI:  No new issues son dedicated caregiver. Tolerates meds. Remarkably free of concerns.     Review of Systems:   Please see above.  The rest of the review of systems are negative for all systems.    Patient Care Team:  Chilo العلي MD as PCP - General Rm ARZATE" MD Domingo (Orthopedic Surgery)     Patient Active Problem List   Diagnosis     Esophageal Disorders     Chronic Pain Syndrome     Osteoarthritis     Osteoporosis     Do Not Resuscitate Form In Chart     Irritable Bowel Syndrome     Osteoarthritis Of The Hip     Frailty     Carpal Tunnel Syndrome     Diverticulosis     Nephrolithiasis     Urinary Frequency     Senile osteoporosis     Memory problem     No past medical history on file.   Past Surgical History:   Procedure Laterality Date     [place] APPENDECTOMY      Description: Appendectomy;  Recorded: 11/30/2007;     [place] REMOVE TONSILS/ADENOIDS,<13 Y/O      Description: Tonsillectomy With Adenoidectomy;  Recorded: 11/30/2007;     [place] TOTAL HIP ARTHROPLASTY      Description: Total Hip Replacement;  Recorded: 11/30/2007;     [place] TOTAL HIP ARTHROPLASTY      Description: Total Hip Replacement;  Recorded: 11/30/2007;  Comments: total 5 hip revisions      No family history on file.   Social History     Social History     Marital status:      Spouse name: N/A     Number of children: N/A     Years of education: N/A     Occupational History     Not on file.     Social History Main Topics     Smoking status: Never Smoker     Smokeless tobacco: Not on file     Alcohol use Not on file     Drug use: Not on file     Sexual activity: Not on file     Other Topics Concern     Not on file     Social History Narrative      Current Outpatient Prescriptions   Medication Sig Dispense Refill     amoxicillin (AMOXIL) 500 MG capsule Take 500 mg by mouth as needed. Take 4 capsules 1 hour before dental appointments       celecoxib (CELEBREX) 200 MG capsule TAKE 1 CAPSULE DAILY 90 capsule 1     multivitamin therapeutic (THERAGRAN) tablet Take 1 tablet by mouth daily.       raloxifene (EVISTA) 60 mg tablet TAKE 1 TABLET DAILY. DUE   FOR CLINIC VISIT PRIOR TO  FURTHER REFILLS. 90 tablet 0     triamcinolone (KENALOG) 0.1 % cream Apply to rash with Nystatin cream twice daily as needed 30 g 1  "    No current facility-administered medications for this visit.       Objective:   Vital Signs:   Visit Vitals     /60 (Patient Site: Left Arm, Patient Position: Sitting, Cuff Size: Adult Regular)     Pulse 88     Ht 5' 3\" (1.6 m)     Wt 141 lb (64 kg)     BMI 24.98 kg/m2        VisionScreening:  No exam data present     PHYSICAL EXAM  In wheelchair.   HEENT neg   Lungs clear  Cor- reg   abd benign   Ext. Trace dependent edema/ marked arthritic changes\  Neuro- pleasant affect mildly confused at times converses without problems    Assessment Results 10/23/2017   Mini Cog Total Score 0   Some recent data might be hidden     A Mini-Cog score of 0-2 suggests the possibility of dementia, score of 3-5 suggests no dementia    Identified Health Risks:     She is at risk for falling and has been provided with information to reduce the risk of falling at home.  The patient was provided with appropriate referrals to address her memory problem.    Recent Results (from the past 240 hour(s))   HM2(CBC w/o Differential)   Result Value Ref Range    WBC 5.8 4.0 - 11.0 thou/uL    RBC 4.23 3.80 - 5.40 mill/uL    Hemoglobin 13.6 12.0 - 16.0 g/dL    Hematocrit 41.6 35.0 - 47.0 %    MCV 98 80 - 100 fL    MCH 32.2 27.0 - 34.0 pg    MCHC 32.8 32.0 - 36.0 g/dL    RDW 10.9 (L) 11.0 - 14.5 %    Platelets 181 140 - 440 thou/uL    MPV 8.5 7.0 - 10.0 fL   Comprehensive Metabolic Panel   Result Value Ref Range    Sodium 146 (H) 136 - 145 mmol/L    Potassium 4.3 3.5 - 5.0 mmol/L    Chloride 113 (H) 98 - 107 mmol/L    CO2 24 22 - 31 mmol/L    Anion Gap, Calculation 9 5 - 18 mmol/L    Glucose 98 70 - 125 mg/dL    BUN 19 8 - 28 mg/dL    Creatinine 0.71 0.60 - 1.10 mg/dL    GFR MDRD Af Amer >60 >60 mL/min/1.73m2    GFR MDRD Non Af Amer >60 >60 mL/min/1.73m2    Bilirubin, Total 0.2 0.0 - 1.0 mg/dL    Calcium 8.7 8.5 - 10.5 mg/dL    Protein, Total 6.2 6.0 - 8.0 g/dL    Albumin 3.3 (L) 3.5 - 5.0 g/dL    Alkaline Phosphatase 63 45 - 120 U/L    AST " 16 0 - 40 U/L    ALT 10 0 - 45 U/L

## 2021-06-14 NOTE — PROGRESS NOTES
[facility] Follow Up Note    Assessment/Plan:  1. Age-related osteoporosis without current pathological fracture-history of hip and femur fracture  Severe as defined by history of femur/hip fracture and bone mineral density T score less than -3.5.  Continues to decline while on [place].  Recommendations: Patient needs more aggressive therapy.  Recommend Prolia injections.  We will proceed with prior authorization.  Additionally, have discussed with son importance of getting dietary and supplemental calcium to approach approximately 1200 mg/day.  We will add vitamin D at 2000 international units daily.  Of note, he is already implementing fall prevention strategies within the home.  She is wheelchair-bound and ambulates with assistance only.  Once medication approved-they will come here for injections every 6 months.  Follow-up with me in 1 year.  She will need life time treatments  - Vitamin D, Total (25-Hydroxy)  - HM2(CBC w/o Differential)  - Basic Metabolic Panel  - denosumab 60 mg (PROLIA 60 mg/ml)    2. Memory problem  Progressive cognitive impairments  - HM2(CBC w/o Differential)    3. Arthritis pain  On Celebrex.  He states he is using 200 mg every other day.  He and his family physician are worried about potential side effects of chronic nonsteroidal usage.  He is not sure of the significance of her pain as she does not complain.  Recommendation: We will cut Celebrex back to 100 mg daily-wean as able  - celecoxib (CELEBREX) 100 MG capsule; Take 1 capsule (100 mg total) by mouth daily.  Dispense: 60 capsule; Refill: 2      Time spent today in excess of 45 minutes with the majority that time spent in counseling and discussion of bone health    Wilda Peterson MD    Chief Complaint:  Chief Complaint   Patient presents with     Osteoporosis Consult       History of Present Illness:  Lorna is a 89 y.o. female who is here today with her son for an osteoporosis consult.  Of note, she is relatively  noncommunicative.  She has advanced dementia.  According to her son, it has progressed significantly over the past year.  Of note, she currently resides in his home.  He provides complete care.  She is in a wheelchair for the most part.  She is able to transfer with 1 person assist to a walker for toileting, dressing and getting to the bed.  He does report that he goes out for short intervals of time.  She does not try to leave her chair.  She does not have any agitative behavior and has not had any falls.    Her history is reviewed.  She has been on Evista for several years.  The chart is reviewed in depth.  She was on this back in 2014.  She has had progressive decline of bone mineral density despite this.    Fracture history is reviewed.  She had a hip and femur fracture in her 80s.    Dietary history reviewed: She does not eat dairy products.  She eats a variety of fruits and vegetables.  He states that she is cooperative with food.  She is on very little prescription medication.    Bone densitometry reviewed:    Assessment:     1. The spine bone density L1-L4 with T-score -4.0.  2.  Left one third radius T score -6.1..  3. Trabecular bone score indicates poor trabecular bone architecture.        89 y.o. female with SEVERE OSTEOPOROSIS and HIGH fracture risk.     Review of Systems:  A comprehensive review of systems was performed and was otherwise negative    PFSH:  Social History: She lives with her son.  Social History     Tobacco Use   Smoking Status Never Smoker   Smokeless Tobacco Never Used       Past History:   Past Medical History:   Diagnosis Date     Carpal tunnel syndrome     Created by Conversion      Chronic pain syndrome     Created by Conversion      Diverticulosis     Created by Conversion  Replacement Utility updated for latest IMO load     Do Not Resuscitate Form In Chart     Created by [facility] Annotation: Feb 2 2012  3:45PM - Chilo العلي: discussed with pt  and son Scott       Esophageal Disorders     Created by [facility]version [facility] Annotation: Feb 2 2012  3:40PM - Chilo العلي:  GERD/presbyesophagus  Replacement Utility updated for latest IMO load     Frailty     Created by Conversion  Replacement Utility updated for latest IMO load     Irritable bowel syndrome     Created by Conversion      Memory problem 10/23/2017    Senile type dementia -began ~ age 89     Nephrolithiasis     Created by Conversion      Osteoarthritis     Created by Conversion [facility] Annotation: Nov 30 2007  8:51AM - Chilo العلي: multiple sites  Replacement Utility updated for latest IMO load     Osteoarthritis Of The Hip     Created by Conversion      Osteoporosis     Created by Conversion  Replacement Utility updated for latest IMO load     Senile osteoporosis 1/16/2015     Urinary frequency     Created by Conversion        Current Outpatient Medications   Medication Sig Dispense Refill     amoxicillin (AMOXIL) 500 MG capsule Take 500 mg by mouth as needed. Take 4 capsules 1 hour before dental appointments       raloxifene (EVISTA) 60 mg tablet TAKE 1 TABLET DAILY 30 tablet 0     celecoxib (CELEBREX) 100 MG capsule Take 1 capsule (100 mg total) by mouth daily. 60 capsule 2     Current Facility-Administered Medications   Medication Dose Route Frequency Provider Last Rate Last Admin     [START ON 1/21/2021] denosumab 60 mg (PROLIA 60 mg/ml)  60 mg Subcutaneous Once Wilda Peterson MD           Family History: No family history on file.      Physical Exam:  General Appearance:   She appears comfortable.  Vitals:    01/07/21 1007   Pulse: (!) 102   Temp: 97  F (36.1  C)   TempSrc: Tympanic   SpO2: 95%   Weight: 113 lb (51.3 kg)     Wt Readings from Last 3 Encounters:   01/07/21 113 lb (51.3 kg)   09/21/20 115 lb (52.2 kg)   08/20/19 122 lb 4.8 oz (55.5 kg)     Body mass index is 20.02 kg/m .    Does not answer any questions.  Head neck exam is negative.  Oral cavity is examined.  No ulcerations are noted.   She has an upper denture plate.  Lower teeth are intact  Lungs are clear to auscultation and percussion  Cardiac exam reveals regular rate and rhythm with no murmurs or gallops  Lower extremity examination reveals 1+ edema bilaterally.    Data Review:    Analysis and Summary of Old Records (2): Reviewed old records    Records Requested (1):       Other History Summarized (from other people in the room) (2): Son contributes to the history    Radiology Tests Summarized (XRAY/CT/MRI/DXA) (1): Interpreted DEXA independently    Labs Reviewed (1): Ordered labs    Medicine Tests Reviewed (EKG/ECHO/COLONOSCOPY/EGD) (1):     Independent Review of EKG or X-RAY (2):

## 2021-06-14 NOTE — TELEPHONE ENCOUNTER
"Prolia Injection Phone Screen      Screening questions have been asked 2-3 days prior to administration visit for Prolia. If any questions are answered with \"Yes,\" this phone encounter were will routed to ordering provider for further evaluation.     1.  When was the last injection?  First injection    2.  Has insurance for this injection been verified?  Yes    3.  Did you experience any new onset achiness or rashes that lasted for over a month with your previous Prolia injection?   No    4.  Do you have a fever over 101?F or a new deep cough that is unusual for you today? No    5.  Have you started any new medications in the last 6 months that you were told could affect your immune system? These may have been prescribed by oncologist, transplant, rheumatology, or dermatology.   No    6.  In the last 6 months have you have gastric bypass or parathyroid surgery?   No    7.  Do you plan dental work requiring drilling into the bone such as implants/extractions or oral surgery in the next 2-3 months?   No    8. Do you have new insurance since the last injection? First injection    Patient informed if symptoms discussed above present prior to their administration appointment, they are to notify clinic immediately.         Marisela RAYMOND(R)            "

## 2021-06-14 NOTE — TELEPHONE ENCOUNTER
Reason for Call:  Form, our goal is to have forms completed with 72 hours, however, some forms may require a visit or additional information.    Type of letter, form or note:  legal    Who is the form from?: Son's  (if other please explain)    Where did the form come from: Patient or family brought in   by son    What clinic location was the form placed at?: [facility].     Where the form was placed: her son is going to drop off the forms    What number is listed as a contact on the form?: [phone]       Additional comments:  advised son to file paperwork due to patient's declining memory.    Call taken on 1/14/2021 at 2:49 PM by Haritha Talley

## 2021-06-14 NOTE — TELEPHONE ENCOUNTER
Please let her son know that she is severely deficient in vitamin D.  I am going to order a prescription for her to take once a week for the next 3 months.  Additionally, I would like her to take the oral vitamin D that we discussed at the office.  Hopefully, this will get her to a better place.

## 2021-06-14 NOTE — TELEPHONE ENCOUNTER
----- Message from Balbina Bautista sent at 1/16/2021  9:04 AM CST -----  Regarding: prolia dx  Hello,  This patient is scheduled for prolia.  With their insurance coverage, it follows Medicare's NGS policy.  Currently we have the DX M81.0 but this request will need an additional diagnosis to support why this patient is not appropriate for other therapies.      M81.8 (ICD-10-CM) - Other osteoporosis without current pathological fracture (##covered as a single code)    Z92.29 - Personal history of other drug therapy  T50.995S - Adverse effect of other drugs, medicaments and biological substances, sequela  T88.7XXS - Unspecified adverse effect of drug or medicament, sequela  Z88.8 - Allergy status to other drugs, medicaments and biological substances status    N18.3 - Chronic kidney disease, stage 3 (moderate)  N18.4 - Chronic kidney disease, stage 4 (severe)  N18.5 - Chronic kidney disease, stage 5    Would a second diagnosis code apply to this patient? If so, can you please have it added to the order?  Thank you,  Balbina

## 2021-06-19 NOTE — LETTER
Letter by Dee Hester MD at      Author: Dee Hester MD Service: -- Author Type: --    Filed:  Encounter Date: 8/27/2019 Status: (Other)         Lorna England  [address] [place] [address]             August 27, 2019         Dear Ms. Samanta,    Below are the results from your recent visit:    Resulted Orders   DXA Bone Density Scan    Narrative    8/23/2019      RE: oLrna England  YOB: 1924        Dear Dee Hester,    Patient Profile:  89 y.o. female, postmenopausal, is here for the first bone density test.   History of fractures - Yes;  Hip and Femur. Family history of osteoporosis   - None.  Family history of hip fracture: None. Smoking history - No.   Osteoporosis treatment past -  No. Osteoporosis treatment current - Yes;    Evista.  Chronic medical problems - Hip replacement  and History of kidney   stones. High risk medications -  None.      Assessment:    1. The spine bone density L1-L3 with T-score -3.9.  2. Femoral bone densities are not able to be assessed due to the history   of previous fracture and hip replacement.  3. Trabecular bone score indicates poor trabecular bone architecture.  4.  Left distal radius T score -5.1    89 y.o. female with SEVERE OSTEOPOROSIS and HIGH fracture risk.         Recommendations:  Appropriate reevaluation and reassessment of current treatment, with   consideration for a stronger agent such as Prolia, is recommended with   follow up bone density scan in 1 year.      Bone densitometry was performed on your patient using our [employer]   densitometer. The results are summarized and a copy of the actual scans   are included for your review. In conformity with the International Society   of Clinical Densitometry's most recent position statement for DXA   interpretation (2015), the diagnosis will be made on the lowest measured   T-score of the lumbar spine, femoral neck, total proximal femur or 33%   radius. Note the change in terminology for  diagnostic classification from   OSTEOPENIA to LOW BONE MASS. All trending for sequential exams will be   done using multiple vertebrae or the total proximal femur. Fracture risk   is based on the WHO Fracture Risk Assessment Tool (FRAX). If additional   information is needed or if you would like to discuss the results, please   do not hesitate to call me.       Thank you for referring this patient to [facility].   We are happy to be of service in support of you and your practice. If you   have any questions or suggestions to improve our service, please call me   at [phone].     Sincerely,     Wilda Peterson M.D. C.C.D.  Osteoporosis Services, [facility]       Lorna, here are your recent bone density results which still show significant osteoporosis.  I recommend you see one of our osteoporosis care providers to discuss possibly using different medication for osteoporosis.  Please make an appointment or call to discuss.    Please call with questions or contact us using [employer].    Sincerely,        Electronically signed by Dee Hester MD

## 2021-06-19 NOTE — LETTER
Letter by Dee Hester MD at      Author: Dee Hester MD Service: -- Author Type: --    Filed:  Encounter Date: 8/20/2019 Status: (Other)         Lorna England  [address] [place] [address]             August 20, 2019         Dear Ms. Samanta,    Below are the results from your recent visit:    Resulted Orders   Electrolyte Profile   Result Value Ref Range    Sodium 141 136 - 145 mmol/L    Potassium 4.2 3.5 - 5.0 mmol/L    CO2 27 22 - 31 mmol/L    Chloride 108 (H) 98 - 107 mmol/L    Anion Gap, Calculation 6 5 - 18 mmol/L   Creatinine   Result Value Ref Range    Creatinine 0.64 0.60 - 1.10 mg/dL    GFR MDRD Af Amer >60 >60 mL/min/1.73m2    GFR MDRD Non Af Amer >60 >60 mL/min/1.73m2       Labs are ok and stable.  Great to meet you today!    Please call with questions or contact us using [employer].    Sincerely,        Electronically signed by Dee Hester MD

## 2021-06-19 NOTE — LETTER
Letter by Svetlana Luong CHW at      Author: Svetlana Luong CHW Service: -- Author Type: --    Filed:  Encounter Date: 8/27/2019 Status: (Other)       Dear Lorna,                                                                         You were recently referred to the [facility]'s Clinic Care Coordination service.  This is a service offered through your Primary Care Clinic which can help you access resources, services in regard to your health and well-being goals. The clinic Community Health Worker has placed two calls to you to discuss the nature of this service and to offer enrollment.  If you are interested in learning more about Clinic Care Coordination, please call your Primary Care Clinic's Community Health Worker, Sharon, at [phone].                                                          Sincerely,                                                                              PATRICIA Herrera                                                                                          Clinic Care Coordination                                                  [facility]                                Phone: [phone]

## 2021-06-21 NOTE — LETTER
Letter by Dee Hester MD at      Author: Dee Hester MD Service: -- Author Type: --    Filed:  Encounter Date: 12/9/2020 Status: (Other)         Lorna [place]  [address] [place] [address]             December 9, 2020         Dear Ms. Samanta,    Below are the results from your recent visit:    Resulted Orders   DXA Bone Density Scan    Narrative    12/3/2020      RE: Lorna England  YOB: 1924        Dear Dee Hester,    Patient Profile:  89 y.o. female, postmenopausal, is here for the follow up bone density   test.   History of fractures - Yes;  Femur. Family history of osteoporosis - None.    Family history of hip fracture: None. Smoking history - No. Osteoporosis   treatment past -  No. Osteoporosis treatment current - Yes;  Evista.    Chronic medical problems - Chronic low back problems, Hip replacement  and   History of kidney stones. High risk medications -  None.      Assessment:    1. The spine bone density L1-L4 with T-score -4.0.  2.  Left one third radius T score -6.1..  3. Trabecular bone score indicates poor trabecular bone architecture.      89 y.o. female with SEVERE OSTEOPOROSIS and HIGH fracture risk.     Since the previous bone density dated  August 23, 2019, there has been a     -11.9% change in the bone density of the spine.  Additionally there has   been a -19.5% change in the left distal radius.                                     Recommendations:  Appropriate reevaluation and a switch to more aggressive pharmacologic   treatment is recommended with follow up bone density scan in 1 to 2 years.      Bone densitometry was performed on your patient using our [employer]   densitometer. The results are summarized and a copy of the actual scans   are included for your review. In conformity with the International Society   of Clinical Densitometry's most recent position statement for DXA   interpretation (2015), the diagnosis will be made on the lowest measured   T-score  of the lumbar spine, femoral neck, total proximal femur or 33%   radius. Note the change in terminology for diagnostic classification from   OSTEOPENIA to LOW BONE MASS. All trending for sequential exams will be   done using multiple vertebrae or the total proximal femur. Fracture risk   is based on the WHO Fracture Risk Assessment Tool (FRAX). If additional   information is needed or if you would like to discuss the results, please   do not hesitate to call me.       Thank you for referring this patient to [facility].   We are happy to be of service in support of you and your practice. If you   have any questions or suggestions to improve our service, please call me   at [phone].     Sincerely,     Wilda Peterson M.D. C.C.RAMONA.  Osteoporosis Services, [facility]       Lorna has significant osteoporosis.  I would recommend you be seen in our osteoporosis clinic.  Please call me at [phone] an we can assist in arranging appointment.      Please call with questions or contact us using [employer].    Sincerely,        Electronically signed by eDe Hester MD

## 2021-06-21 NOTE — LETTER
Letter by Wilda Peterson MD at      Author: Wilda Peterson MD Service: -- Author Type: --    Filed:  Encounter Date: 1/8/2021 Status: (Other)         Lorna [place]  [address] [place] [address]             January 8, 2021         Dear Ms. Samanta,    Below are the results from your recent visit:    Resulted Orders   Vitamin D, Total (25-Hydroxy)   Result Value Ref Range    Vitamin D, Total (25-Hydroxy) 9.0 (L) 30.0 - 80.0 ng/mL    Narrative    Deficiency <10.0 ng/mL  Insufficiency 10.0-29.9 ng/mL  Sufficiency 30.0-80.0 ng/mL  Toxicity (possible) >100.0 ng/mL   HM2(CBC w/o Differential)   Result Value Ref Range    WBC 4.1 4.0 - 11.0 thou/uL      Comment:      This is a corrected result. Previous result was 4.2 thou/uL on 1/7/2021 at 1130 CST    RBC 4.35 3.80 - 5.40 mill/uL      Comment:      This is a corrected result. Previous result was 4.42 mill/uL on 1/7/2021 at 1130 CST    Hemoglobin 14.1 12.0 - 16.0 g/dL      Comment:      This is a corrected result. Previous result was 14.3 g/dL on 1/7/2021 at 1130 CST    Hematocrit 43.8 35.0 - 47.0 %      Comment:      This is a corrected result. Previous result was 41.3 % on 1/7/2021 at 1130 CST     (H) 80 - 100 fL      Comment:      This is a corrected result. Previous result was 94 fL on 1/7/2021 at 1130 CST    MCH 32.4 27.0 - 34.0 pg      Comment:      This is a corrected result. Previous result was 32.5 pg on 1/7/2021 at 1130 CST    MCHC 32.2 32.0 - 36.0 g/dL      Comment:      This is a corrected result. Previous result was 34.7 g/dL on 1/7/2021 at 1130 CST    RDW 13.1 11.0 - 14.5 %      Comment:      This is a corrected result. Previous result was 11.3 % on 1/7/2021 at 1130 CST    Platelets 161 140 - 440 thou/uL      Comment:      This is a corrected result. Previous result was 60 thou/uL on 1/7/2021 at 1130 CST    MPV 11.5 8.5 - 12.5 fL      Comment:      This is a corrected result. Previous result was 9.3 fL on 1/7/2021 at 1130 CST   Basic Metabolic  Panel   Result Value Ref Range    Sodium 139 136 - 145 mmol/L    Potassium        Comment:      Specimen hemolyzed, result invalid    Chloride 107 98 - 107 mmol/L    CO2 23 22 - 31 mmol/L    Anion Gap, Calculation 9 5 - 18 mmol/L    Glucose 78 70 - 125 mg/dL    Calcium 8.5 8.5 - 10.5 mg/dL    BUN 18 8 - 28 mg/dL    Creatinine 0.62 0.60 - 1.10 mg/dL    GFR MDRD Af Amer >60 >60 mL/min/1.73m2    GFR MDRD Non Af Amer >60 >60 mL/min/1.73m2    Narrative    Fasting Glucose reference range is 70-99 mg/dL per  American Diabetes Association (ADA) guidelines.       Kidney functions and blood count are normal.  The vitamin D is seriously deficient.  Hopefully, by the time you get this letter, she is already on her vitamin D supplements.    Please call with questions or contact us using [employer].    Sincerely,        Electronically signed by Wilda Peterson MD

## 2021-06-21 NOTE — LETTER
Letter by Dee Hester MD at      Author: Dee Hester MD Service: -- Author Type: --    Filed:  Encounter Date: 12/4/2020 Status: (Other)         Lorna England  [address] [place] [address]             December 4, 2020         Dear Ms. Samanta,    Below are the results from your recent visit:    Resulted Orders   XR Chest 2 Views    Narrative    EXAM DATE:         12/03/2020    EXAM: X-RAY CHEST, 2 VIEWS, FRONTAL AND LATERAL  LOCATION: [facility]  DATE/TIME: 12/3/2020 2:30 PM    INDICATION: Age-related osteoporosis without current pathological fracture  COMPARISON: Thoracic MRI report 09/18/2006    IMPRESSION: Patient's quite rotated on the lateral view.    Lungs are clear. Heart and pulmonary vascularity are normal.    Bones are quite demineralized. Severe S-shaped thoracolumbar scoliosis with the curve convex to the right centered at T9. There is mild wedge compression fracture of T7, age indeterminant but new since 2006. Moderate degenerative changes in the   midthoracic spine.    There is a 1.8 x 1 cm calcification in the right upper quadrant. Unclear if this is a renal stone or a gallstone. Other calcifications in the left upper quadrant likely reflect splenic artery calcifications.                 Oleksandr Rothman, here are your recent chest x-ray results.  It doesn't show any specific explanation for your cough, however, there are multiple other findings, including osteoporosis findings and mild wedge fracture.  I recommend you come in for office visit and sign a consent to communicate so we can speak with your son regarding these results/further plan.  My nurse also left a voicemail for you.    Please call with questions or contact us using [employer].    Sincerely,        Electronically signed by Dee Hester MD

## 2021-06-24 NOTE — TELEPHONE ENCOUNTER
RN cannot approve Refill Request    RN can NOT refill this medication med is not covered by policy/route to provider. Last office visit: 10/1/2015 Chilo العلي MD Last Physical: 10/23/2017 Last MTM visit: Visit date not found Last visit same specialty: 10/1/2015 hCilo العلي MD.  Next visit within 3 mo: Visit date not found  Next physical within 3 mo: Visit date not found      Svetlana Glover, [facility] Triage/Med Refill 2/13/2019    Requested Prescriptions   Pending Prescriptions Disp Refills     celecoxib (CELEBREX) 200 MG capsule [Pharmacy Med Name: CELECOXIB CAP 200MG] 90 capsule 1     Sig: TAKE 1 CAPSULE DAILY    There is no refill protocol information for this order

## 2021-06-24 NOTE — TELEPHONE ENCOUNTER
RN cannot approve Refill Request    RN can NOT refill this medication med is not covered by policy/route to provider.    Kilo Abdul, [facility] Triage/Med Refill 3/12/2019    Requested Prescriptions   Pending Prescriptions Disp Refills     raloxifene (EVISTA) 60 mg tablet [Pharmacy Med Name: RALOXIFENE TAB 60MG] 90 tablet 0     Sig: TAKE 1 TABLET DAILY    There is no refill protocol information for this order

## 2021-08-06 NOTE — PATIENT INSTRUCTIONS - HE
Patient Instructions by Dee Hester MD at 9/21/2020  1:00 PM     Author: Dee Hester MD Service: -- Author Type: Physician    Filed: 9/21/2020  1:19 PM Encounter Date: 9/21/2020 Status: Addendum    : Dee Hester MD (Physician)    Related Notes: Original Note by Dee Hester MD (Physician) filed at 9/21/2020  1:16 PM         Patient Education     Your Health Risk Assessment indicates you feel you are not in good physical health.    A healthy lifestyle helps keep the body fit and the mind alert. It helps protect you from disease, helps you fight disease, and helps prevent chronic disease (disease that doesn't go away) from getting worse. This is important as you get older and begin to notice twinges in muscles and joints and a decline in the strength and stamina you once took for granted. A healthy lifestyle includes good healthcare, good nutrition, weight control, recreation, and regular exercise. Avoid harmful substances and do what you can to keep safe. Another part of a healthy lifestyle is stay mentally active and socially involved.    Good healthcare     Have a wellness visit every year.     If you have new symptoms, let us know right away. Don't wait until the next checkup.     Take medicines exactly as prescribed and keep your medicines in a safe place. Tell us if your medicine causes problems.   Healthy diet and weight control     Eat 3 or 4 small, nutritious, low-fat, high-fiber meals a day. Include a variety of fruits, vegetables, and whole-grain foods.     Make sure you get enough calcium in your diet. Calcium, vitamin D, and exercise help prevent osteoporosis (bone thinning).     If you live alone, try eating with others when you can. That way you get a good meal and have company while you eat it.     Try to keep a healthy weight. If you eat more calories than your body uses for energy, it will be stored as fat and you will gain weight.     Recreation   Recreation is not limited  to sports and team events. It includes any activity that provides relaxation, interest, enjoyment, and exercise. Recreation provides an outlet for physical, mental, and social energy. It can give a sense of worth and achievement. It can help you stay healthy.       Patient Education   Activities of Daily Living  Your Health Risk Assessment indicates you have difficulties with activities of daily living such as eating, getting dressed, grooming, bathing, walking, or using the toilet. Please make a follow up appointment for us to address this issue in more detail.     Patient Education   Instrumental Activities of Daily Living  Your Health Risk Assessment indicates you have difficulties with instrumental activities of daily living which include laundry, housekeeping, banking, shopping, using the telephone, food preparation, transportation, or taking your own medications. Please make a follow up appointment for us to address this issue in more detail.    [employer] has resources available on the following website: [email or web address].org/caregivers.[email or web address]     Also, here is a local agency that provides help with meals and other assistance:   [facility] Line: 273.624.5075     Patient Education   Urinary Incontinence, Female (Adult)  Urinary incontinence means loss of control of the bladder. This problem affects many women, especially as they get older. If you have incontinence, you may be embarrassed to ask for help. But know that this problem can be treated.  Types of Incontinence  There are different types of incontinence. Two of the main types are described here. You can have more than one type.    Stress incontinence. With this type, urine leaks when pressure (stress) is put on the bladder. This may happen when you cough, sneeze, or laugh. Stress incontinence most often occurs because the pelvic floor muscles that support the bladder and urethra are weak. This can happen after pregnancy and vaginal childbirth or  a hysterectomy. It can also be due to excess body weight or hormone changes.    Urge incontinence (also called overactive bladder). With this type, a sudden urge to urinate is felt often. This may happen even though there may not be much urine in the bladder. The need to urinate often during the night is common. Urge incontinence most often occurs because of bladder spasms. This may be due to bladder irritation or infection. Damage to bladder nerves or pelvic muscles, constipation, and certain medicines can also lead to urge incontinence.  Treatment of urinary incontinence depends on the cause. Further evaluation is needed to find the type you have. This will likely include an exam and certain tests. Based on the results, you and your healthcare provider can then plan treatment. Until a diagnosis is made, the home care tips below can help relieve symptoms.  Home care    Do pelvic floor muscle exercises, if they are prescribed. The pelvic floor muscles help support the bladder and urethra. Many women find that their symptoms improve when doing special exercises that strengthen these muscles. To do the exercises contract the muscles you would use to stop your stream of urine, but do this when youre not urinating. Hold for 10 seconds, then relax. Repeat 10 to 20 times in a row, at least 3 times a day. Your provider may give you other instructions for how to do the exercises and how often.    Keep a bladder diary. This helps track how often and how much you urinate over a set period of time. Bring this diary with you to your next visit with the provider. The information can help your provider learn more about your bladder problem.    Lose weight, if advised to by your provider. Excess weight puts pressure on the bladder. Your provider can help you create a weight-loss plan thats right for you. This may include exercising more and making certain diet changes.    Don't consume foods and drinks that may irritate the  bladder. These can include alcohol and caffeinated drinks.    Quit smoking. Smoking and other tobacco use can lead to chronic cough that strains the pelvic floor muscles. Smoking may also damage the bladder and urethra. Talk with your provider about treatments or methods you can use to quit smoking.    If drinking large amounts of fluid causes you to have symptoms, you may be advised to limit your fluid intake. You may also be advised to drink most of your fluids during the day and to limit fluids at night.    If youre worried about urine leakage or accidents, you may wear absorbent pads to catch urine. Change the pads often. This helps reduce discomfort. It may also reduce the risk of skin or bladder infections.  Follow-up care  Follow up with your healthcare provider, or as directed. It may take some to find the right treatment for your problem. Your treatment plan may include special therapies or medicines. Certain procedures or surgery may also be options. Be sure to discuss any questions you have with your provider.  When to seek medical advice  Call the healthcare provider right away if any of these occur:    Fever of 100.4 F (38 C) or higher, or as directed by your provider    Bladder pain or fullness    Abdominal swelling    Nausea or vomiting    Back pain    Weakness, dizziness or fainting  Date Last Reviewed: 10/1/2017    6487-2564 [employer]. [address] [place] [address], [place] [address]. All rights reserved. This information is not intended as a substitute for professional medical care. Always follow your healthcare professional's instructions.     Patient Education   Your Health Risk Assessment indicates you feel you are not in good emotional health.    Recreation   Recreation is not limited to sports and team events. It includes any activity that provides relaxation, interest, enjoyment, and exercise. Recreation provides an outlet for physical, mental, and social energy. It can give a sense  of worth and achievement. It can help you stay healthy.    Mental Exercise and Social Involvement  Mental and emotional health is as important as physical health. Keep in touch with friends and family. Stay as active as possible. Continue to learn and challenge yourself.   Things you can do to stay mentally active are:    Learn something new, like a foreign language or musical instrument.     Play SCRABBLE or do crossword puzzles. If you cannot find people to play these games with you at home, you can play them with others on your computer through the Internet.     Join a games club--anything from card games to chess or checkers or lawn bowling.     Start a new hobby.     Go back to school.     Volunteer.     Read.     Keep up with world events.       Patient Education   Preventing Falls in the Home  As you get older, falls are more likely. Thats because your reaction time slows. Your muscles and joints may also get stiffer, making them less flexible. Illness, medications, and vision changes can also affect your balance. A fall could leave you unable to live on your own. To make your home safer, follow these tips:    Floors    Put nonskid pads under area rugs.    Remove throw rugs.    Replace worn floor coverings.    Tack carpets firmly to each step on carpeted stairs. Put nonskid strips on the edges of uncarpeted stairs.    Keep floors and stairs free of clutter and cords.    Arrange furniture so there are clear pathways.    Clean up any spills right away.    Bathrooms    Install grab bars in the tub or shower.    Apply nonskid strips or put a nonskid rubber mat in the tub or shower.    Sit on a bath chair to bathe.    Use bathmats with nonskid backing.    Lighting    Keep a flashlight in each room.    Put a nightlight along the pathway between the bedroom and the bathroom.    1351-0043 [employer]. [address] [place] [address], [place] [address]. All rights reserved. This information is not intended as a  substitute for professional medical care. Always follow your healthcare professional's instructions.           Advance Directive  Patients advance directive was discussed and I am comfortable with the patients wishes.  Patient Education   Personalized Prevention Plan  You are due for the preventive services outlined below.  Your care team is available to assist you in scheduling these services.  If you have already completed any of these items, please share that information with your care team to update in your medical record.  Health Maintenance   Topic Date Due   ? ZOSTER VACCINES (2 of 3) 06/30/2009   ? INFLUENZA VACCINE RULE BASED (1) 08/01/2020   ? MEDICARE ANNUAL WELLNESS VISIT  08/20/2020   ? FALL RISK ASSESSMENT  08/20/2020   ? DXA SCAN  08/23/2021   ? ADVANCE CARE PLANNING  08/20/2024   ? TD 18+ HE  08/20/2029   ? PNEUMOCOCCAL IMMUNIZATION 65+ LOW/MEDIUM RISK  Completed   ? HEPATITIS B VACCINES  Aged Out           -increase miralax to every other day or every day to help improve frequency of bowel movements.